# Patient Record
Sex: FEMALE | URBAN - METROPOLITAN AREA
[De-identification: names, ages, dates, MRNs, and addresses within clinical notes are randomized per-mention and may not be internally consistent; named-entity substitution may affect disease eponyms.]

---
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
## 2023-11-20 NOTE — PROCEDURE: MEDICATION COUNSELING
PAST MEDICAL HISTORY:  Alcohol abuse     Cocaine abuse     COVID-19     Heroin abuse     HIV (human immunodeficiency virus infection)      Mirvaso Counseling: Mirvaso is a topical medication which can decrease superficial blood flow where applied. Side effects are uncommon and include stinging, redness and allergic reactions.

## 2025-06-30 NOTE — PROCEDURE: TREATMENT REGIMEN
Detail Level: Zone
Otc Regimen: Probiotics
Plan: Accutane brochure provided to [facility] and pt. Discussed starting Accutane in 2 months if acne not improved with new regimen. Rec OCP like [facility] for acne referred to GYN for evaluation. Hx depression currently under the care of therapist would need approval letter.
Initiate Treatment: Doxycycline 100 mg bid \\nTretinoin 0.025% cream qhs \\nClindamycin 1% lotion qam \\nPanoxyl wash qhs \\nCerave qam or mild cleanser
no